# Patient Record
Sex: MALE | Race: WHITE | Employment: OTHER | URBAN - METROPOLITAN AREA
[De-identification: names, ages, dates, MRNs, and addresses within clinical notes are randomized per-mention and may not be internally consistent; named-entity substitution may affect disease eponyms.]

---

## 2017-10-17 ENCOUNTER — HOSPITAL ENCOUNTER (OUTPATIENT)
Age: 62
Discharge: HOME | End: 2017-10-17
Attending: EMERGENCY MEDICINE
Payer: COMMERCIAL

## 2017-10-17 VITALS
TEMPERATURE: 98 F | OXYGEN SATURATION: 100 % | BODY MASS INDEX: 27.77 KG/M2 | RESPIRATION RATE: 18 BRPM | HEIGHT: 72 IN | HEART RATE: 61 BPM | WEIGHT: 205 LBS | SYSTOLIC BLOOD PRESSURE: 143 MMHG | DIASTOLIC BLOOD PRESSURE: 81 MMHG

## 2017-10-17 DIAGNOSIS — J01.90 ACUTE SINUSITIS, RECURRENCE NOT SPECIFIED, UNSPECIFIED LOCATION: Primary | ICD-10-CM

## 2017-10-17 PROCEDURE — 99213 OFFICE O/P EST LOW 20 MIN: CPT

## 2017-10-17 PROCEDURE — 99203 OFFICE O/P NEW LOW 30 MIN: CPT

## 2017-10-17 RX ORDER — ASPIRIN 81 MG/1
TABLET, CHEWABLE ORAL DAILY
COMMUNITY

## 2017-10-17 RX ORDER — FEXOFENADINE HCL AND PSEUDOEPHEDRINE HCI 60; 120 MG/1; MG/1
1 TABLET, EXTENDED RELEASE ORAL 2 TIMES DAILY
Qty: 20 TABLET | Refills: 0 | Status: SHIPPED | OUTPATIENT
Start: 2017-10-17 | End: 2017-10-27

## 2017-10-17 RX ORDER — AMOXICILLIN AND CLAVULANATE POTASSIUM 875; 125 MG/1; MG/1
1 TABLET, FILM COATED ORAL 2 TIMES DAILY
Qty: 20 TABLET | Refills: 0 | Status: SHIPPED | OUTPATIENT
Start: 2017-10-17 | End: 2017-10-27

## 2017-10-17 RX ORDER — FLUTICASONE PROPIONATE 50 MCG
2 SPRAY, SUSPENSION (ML) NASAL DAILY
Qty: 16 G | Refills: 0 | Status: SHIPPED | OUTPATIENT
Start: 2017-10-17 | End: 2017-11-16

## 2017-10-17 NOTE — ED INITIAL ASSESSMENT (HPI)
C/o sinus-like pressure on right side face around jaw line and under right eye for 1 week; has had runny nose as well; denies cough, sore throat fever or NVD

## 2017-10-17 NOTE — ED PROVIDER NOTES
Patient Seen in: [address]    History   Patient presents with:  Cough/URI    Stated Complaint: sinus pain    HPI    79-year-old male patient presents her complaining of a right-sided facial pain associated congestion an to display    ============================================================  ED Course  ------------------------------------------------------------  MDM           Disposition and Plan     Clinical Impression:  Acute sinusitis, recurrence not specified, uns

## 2018-08-06 ENCOUNTER — HOSPITAL ENCOUNTER (OUTPATIENT)
Age: 63
Discharge: HOME | End: 2018-08-06
Attending: EMERGENCY MEDICINE
Payer: COMMERCIAL

## 2018-08-06 VITALS
SYSTOLIC BLOOD PRESSURE: 146 MMHG | OXYGEN SATURATION: 100 % | HEART RATE: 52 BPM | RESPIRATION RATE: 16 BRPM | TEMPERATURE: 97 F | DIASTOLIC BLOOD PRESSURE: 79 MMHG

## 2018-08-06 DIAGNOSIS — H60.502 ACUTE OTITIS EXTERNA OF LEFT EAR, UNSPECIFIED TYPE: Primary | ICD-10-CM

## 2018-08-06 PROCEDURE — 99213 OFFICE O/P EST LOW 20 MIN: CPT

## 2018-08-06 PROCEDURE — 99214 OFFICE O/P EST MOD 30 MIN: CPT

## 2018-08-06 RX ORDER — OFLOXACIN 3 MG/ML
SOLUTION AURICULAR (OTIC) DAILY
Qty: 1 BOTTLE | Refills: 0 | Status: SHIPPED | OUTPATIENT
Start: 2018-08-06 | End: 2018-08-13

## 2018-08-06 RX ORDER — AMOXICILLIN AND CLAVULANATE POTASSIUM 875; 125 MG/1; MG/1
1 TABLET, FILM COATED ORAL 2 TIMES DAILY
Qty: 20 TABLET | Refills: 0 | Status: SHIPPED | OUTPATIENT
Start: 2018-08-06 | End: 2018-08-16

## 2018-08-06 NOTE — ED PROVIDER NOTES
Patient Seen in: [address]    History   Patient presents with:  Ear Problem Pain (neurosensory)    Stated Complaint: left ear pain    HPI    The patient has been complaining about discomfort in his left ear for approxim left.  Mild discomfort with tugging on pinna. There is erythema and mild swelling of the ear canal on the left with suggestion of slight discharge. Limited view of TM, about redness, or bulging  Neck: Normal range of motion. Neck supple.  No rigidity or a

## 2020-09-21 ENCOUNTER — OFFICE VISIT (OUTPATIENT)
Dept: SURGERY | Facility: CLINIC | Age: 65
End: 2020-09-21
Payer: COMMERCIAL

## 2020-09-21 VITALS
SYSTOLIC BLOOD PRESSURE: 146 MMHG | BODY MASS INDEX: 28.17 KG/M2 | RESPIRATION RATE: 16 BRPM | HEIGHT: 72 IN | DIASTOLIC BLOOD PRESSURE: 87 MMHG | HEART RATE: 48 BPM | WEIGHT: 208 LBS

## 2020-09-21 DIAGNOSIS — Z79.82 LONG TERM CURRENT USE OF ASPIRIN: ICD-10-CM

## 2020-09-21 DIAGNOSIS — R35.1 NOCTURIA: ICD-10-CM

## 2020-09-21 DIAGNOSIS — N40.1 BENIGN PROSTATIC HYPERPLASIA WITH URINARY FREQUENCY: ICD-10-CM

## 2020-09-21 DIAGNOSIS — N42.89 ASYMMETRIC PROSTATE: ICD-10-CM

## 2020-09-21 DIAGNOSIS — R97.20 ELEVATED PSA: Primary | ICD-10-CM

## 2020-09-21 DIAGNOSIS — R35.0 BENIGN PROSTATIC HYPERPLASIA WITH URINARY FREQUENCY: ICD-10-CM

## 2020-09-21 DIAGNOSIS — N42.89 PROSTATE INDURATION: ICD-10-CM

## 2020-09-21 PROCEDURE — 3077F SYST BP >= 140 MM HG: CPT | Performed by: UROLOGY

## 2020-09-21 PROCEDURE — 3079F DIAST BP 80-89 MM HG: CPT | Performed by: UROLOGY

## 2020-09-21 PROCEDURE — 99244 OFF/OP CNSLTJ NEW/EST MOD 40: CPT | Performed by: UROLOGY

## 2020-09-21 PROCEDURE — 3008F BODY MASS INDEX DOCD: CPT | Performed by: UROLOGY

## 2020-09-21 NOTE — PATIENT INSTRUCTIONS
Ezequiel Ingram M.D.    1.  At this point in time, you  have decided against a prostate biopsy now, and prefer to be re-evaluated in 4 months. 2.   Return visit with me in 4 months.     3.  Please get blood draw

## 2020-09-21 NOTE — PROGRESS NOTES
Александр Stock is a 62year old male. HPI:   Patient presents with:  elevated psa: rise in the PSA, denies family hx prostate cancer      History provided by patient. Referred by his PCP, Dr. Iesha Lara. Elevated PSA   Problem started 2018.  Most recen daily. Patient denies fume exposure. Family History-- He denies family history of cancers and kidney stones. HISTORY:  No past medical history on file.    Past Surgical History:   Procedure Laterality Date   • COLONOSCOPY  01/2019    osis      N cough, dyspnea and wheezing      PHYSICAL EXAM:   Constitutional: appears well hydrated alert and responsive no acute distress noted  Neurological: Oriented to time, place, person with normal affect  Exam appropriate for age; patellar reflexes normal bilat prostate  Benign prostatic hyperplasia with urinary frequency  Nocturia  Long term current use of aspirin    (R97.20) Elevated PSA  (primary encounter diagnosis)  Chronic. Problem started 2018.  Most recent 06/16/2020 Cecille Lopez) PSA = 5.257, 11% christiano of 3.5, mild voiding dysfunction category; nocturia 1-2x.  He is not taking any medication for this.     (Z79.82) Long term current use of aspirin  Patient is currently taking aspirin 81 mg daily and is at risk for future surgical procedures and will need t

## 2021-01-04 ENCOUNTER — TELEPHONE (OUTPATIENT)
Dept: SURGERY | Facility: CLINIC | Age: 66
End: 2021-01-04

## 2021-01-05 NOTE — TELEPHONE ENCOUNTER
Letter created. In outgoing mail.      Future Appointments   Date Time Provider Emeli Garcia   2/8/2021  1:00 PM Bernard Cooper MD [facility]

## 2021-01-05 NOTE — TELEPHONE ENCOUNTER
` Urology medical assistant---    Please send patient a letter----    12/11/2020 total PSA elevated 4.638; percent free PSA is 9%. Total PSA is mildly improved compared to previous whereas percent free PSA is slightly worse than previous.     I will disc

## 2021-05-26 ENCOUNTER — OFFICE VISIT (OUTPATIENT)
Dept: SURGERY | Facility: CLINIC | Age: 66
End: 2021-05-26
Payer: COMMERCIAL

## 2021-05-26 VITALS — DIASTOLIC BLOOD PRESSURE: 76 MMHG | SYSTOLIC BLOOD PRESSURE: 118 MMHG

## 2021-05-26 DIAGNOSIS — R35.0 BENIGN PROSTATIC HYPERPLASIA WITH URINARY FREQUENCY: ICD-10-CM

## 2021-05-26 DIAGNOSIS — R35.1 NOCTURIA: ICD-10-CM

## 2021-05-26 DIAGNOSIS — N42.89 ASYMMETRIC PROSTATE: ICD-10-CM

## 2021-05-26 DIAGNOSIS — Z79.82 LONG TERM CURRENT USE OF ASPIRIN: ICD-10-CM

## 2021-05-26 DIAGNOSIS — N42.89 PROSTATE INDURATION: ICD-10-CM

## 2021-05-26 DIAGNOSIS — N40.1 BENIGN PROSTATIC HYPERPLASIA WITH URINARY FREQUENCY: ICD-10-CM

## 2021-05-26 DIAGNOSIS — R97.20 ELEVATED PSA: Primary | ICD-10-CM

## 2021-05-26 PROCEDURE — 3074F SYST BP LT 130 MM HG: CPT | Performed by: UROLOGY

## 2021-05-26 PROCEDURE — 3078F DIAST BP <80 MM HG: CPT | Performed by: UROLOGY

## 2021-05-26 PROCEDURE — 99215 OFFICE O/P EST HI 40 MIN: CPT | Performed by: UROLOGY

## 2021-05-26 RX ORDER — CIPROFLOXACIN 500 MG/1
TABLET, FILM COATED ORAL
Qty: 6 TABLET | Refills: 0 | Status: SHIPPED | OUTPATIENT
Start: 2021-05-26

## 2021-05-26 RX ORDER — CEFDINIR 300 MG/1
300 CAPSULE ORAL EVERY 12 HOURS
Qty: 6 CAPSULE | Refills: 0 | Status: SHIPPED | OUTPATIENT
Start: 2021-05-26 | End: 2021-05-29

## 2021-05-26 NOTE — PATIENT INSTRUCTIONS
Guillaume Hudson M.D.    1.  Proceed with plans for ultrasound-guided needle biopsy of prostate in the office    2.    Stop aspirin 7 days before biopsy and NSAIDs such as Motrin, Advil, Aleve, naproxen, ibuprofen 5  d

## 2021-05-26 NOTE — PROGRESS NOTES
HPI:    Patient ID: Triston Marshall is a 60year old male. HPI     Elevated PSA   Chronic. Problem started 2018. Most recent 12/11/2020 [facility]) PSA = 4.638, 9% free PSA (57.5% probability of prostate cancer).   Patient presently denies any asso induration in distal half of right lobe; nodule is suspicious for prostate cancer, especially in combination with his multiple elevated PSA levels; currently taking aspirin 81 mg daily;  I recommend prostate biopsy; patient declines and wants to be re-eval distress. Genitourinary:     Comments: On MANJINDER, prostate 2+ enlarged, 1 cm induration right apex. Musculoskeletal:         General: Normal range of motion. Cervical back: Normal range of motion. Skin:     General: Skin is dry.    Neurological: compared to 06/16/2020 Elvie [facility]zo [facility]) PSA = 5.257, 11% free PSA (33.9% probability of prostate cancer). I explain to patient that he has a 58% probability of prostate cancer and a 42% probability that he does not.  Patient presently denies any symptoms mild voiding dysfunction category; nocturia 1x. He is not currently taking any medication for this. The patient feels this is stable and continues observation.     (Z79.82) Long term current use of aspirin  Chronic.  Patient is currently taking aspirin 81 m particular blood test.  Please asked them to fax us a copy of the results and asked them to give you a copy as well. IF the PSA were to come back in the normal range, we would cancel your scheduled biopsy.       Orders Placed This Encounter      PSA, Tot

## 2021-05-27 ENCOUNTER — TELEPHONE (OUTPATIENT)
Dept: SURGERY | Facility: CLINIC | Age: 66
End: 2021-05-27

## 2021-05-27 RX ORDER — DIAZEPAM 5 MG/1
TABLET ORAL
Qty: 3 TABLET | Refills: 0 | OUTPATIENT
Start: 2021-05-27 | End: 2022-02-15 | Stop reason: ALTCHOICE

## 2021-05-27 NOTE — TELEPHONE ENCOUNTER
Late entry. After scheduling patients prostate biopsy. PT requested a valium to take prior to the procedure. PT aware he needs . Please advise.

## 2021-05-28 NOTE — TELEPHONE ENCOUNTER
Please call in prescription for---    Diazepam 5 mg tablet,  Three tablets p.o. upon arrival at our office for procedure; must have a . To eat breakfast and lunch that day.   Thank you, Dr. Geoff Hernandez

## 2021-06-22 ENCOUNTER — PATIENT MESSAGE (OUTPATIENT)
Dept: SURGERY | Facility: CLINIC | Age: 66
End: 2021-06-22

## 2021-06-23 NOTE — TELEPHONE ENCOUNTER
Verified on availity if patient needs prior auth for pros Bx with [facility] on 6/28    [id number] - Biopsy Of Prostate  In Network   No Auth Required  Office

## 2021-06-25 ENCOUNTER — PATIENT MESSAGE (OUTPATIENT)
Dept: SURGERY | Facility: CLINIC | Age: 66
End: 2021-06-25

## 2021-06-25 ENCOUNTER — TELEPHONE (OUTPATIENT)
Dept: SURGERY | Facility: CLINIC | Age: 66
End: 2021-06-25

## 2021-06-25 NOTE — TELEPHONE ENCOUNTER
Patient asking for a callback to confirm office received the most recent lab results because Dr. Danuta Barbosa office sent him a copy of lab as well but it was not the most recent.

## 2021-06-25 NOTE — TELEPHONE ENCOUNTER
ISIAH pt verified name and . Pt states he had PSA done at Dr. Jose Hernández office. We have not received the results. Called Dr. Jose Hernández office. Fax results received from them.   Results placed in patient's chart for Monday's appointment

## 2021-06-25 NOTE — TELEPHONE ENCOUNTER
DR Yeimy Yates. Pts free PSA is 10.6 up from 9. PA 6.4 UP FROM 5, Results were faxed from PCP office today. Not scanned in [facility] yet but in his chart for Monday's prostate bx. He was wondering if you still think he needs the bx.  He is available at [phone]

## 2021-06-25 NOTE — TELEPHONE ENCOUNTER
Received PSA results from [facility] pt has leona 6/28/21 will hold for upcoming leona in file folder  [facility] is out of the office until 6/28/21.

## 2021-06-28 ENCOUNTER — PROCEDURE (OUTPATIENT)
Dept: SURGERY | Facility: CLINIC | Age: 66
End: 2021-06-28
Payer: COMMERCIAL

## 2021-06-28 VITALS
HEIGHT: 72 IN | BODY MASS INDEX: 28.17 KG/M2 | SYSTOLIC BLOOD PRESSURE: 130 MMHG | WEIGHT: 208 LBS | DIASTOLIC BLOOD PRESSURE: 82 MMHG | HEART RATE: 52 BPM

## 2021-06-28 DIAGNOSIS — R97.20 ELEVATED PSA: Primary | ICD-10-CM

## 2021-06-28 PROCEDURE — 3079F DIAST BP 80-89 MM HG: CPT | Performed by: UROLOGY

## 2021-06-28 PROCEDURE — 55700 BIOPSY OF PROSTATE,NEEDLE/PUNCH: CPT | Performed by: UROLOGY

## 2021-06-28 PROCEDURE — 3075F SYST BP GE 130 - 139MM HG: CPT | Performed by: UROLOGY

## 2021-06-28 PROCEDURE — 76872 US TRANSRECTAL: CPT | Performed by: UROLOGY

## 2021-06-28 PROCEDURE — 3008F BODY MASS INDEX DOCD: CPT | Performed by: UROLOGY

## 2021-06-28 NOTE — TELEPHONE ENCOUNTER
Called patient no answer left message that patient should come to prostate bx as mentioned in [employer] message sent by Dr Michael Yao and if he has any questions to call office.

## 2021-06-28 NOTE — PATIENT INSTRUCTIONS
Jailene Ramirez M.D.    1. Finish your prescribed 3 day course of the 2 different antibiotics as directed. 2. No heavy lifting or strenuous physical activity for a few days.     3. No aspirin or NSAIDs for 24 hours

## 2021-06-28 NOTE — TELEPHONE ENCOUNTER
Urology nurses,    Please call patient at [phone] to make sure that he comes to the office for his prostate biopsy on 6/28/2021 Monday at 11 AM and also make sure that he reviewed the following message that I sent him now Noel evening 6/27/2021 =  \

## 2021-06-28 NOTE — PROGRESS NOTES
[facility] Patient Status:  Specimen    1955 MRN [id number]   Location [address] [place] [address] Attending Cuco Farooq MD   Hosp Day # 0 PCP MD Jayla Red is a 60 imaging the prostate in the transverse and sagittal planes and determining maximum height, width and length dimensions. The decision was then made to proceed with prostate biopsy under ultrasound guidance.  Using a 7\"22 gauge spinal needle and using ultra

## 2021-07-02 ENCOUNTER — PATIENT MESSAGE (OUTPATIENT)
Dept: SURGERY | Facility: CLINIC | Age: 66
End: 2021-07-02

## 2021-07-02 ENCOUNTER — TELEPHONE (OUTPATIENT)
Dept: SURGERY | Facility: CLINIC | Age: 66
End: 2021-07-02

## 2021-07-02 NOTE — TELEPHONE ENCOUNTER
I sent patient the following message by means of \"my chart\" right now ---    \"Boyd,  I just called you now but only received voicemail so I am responding by means of \"my chart\".   First, your prostate biopsy showed a small amount of well differentia

## 2021-07-06 NOTE — TELEPHONE ENCOUNTER
See 6/28/21 Path report. Result note copied below. Patient contacted. Patient states he read the mychart result note from Dr. Parker Washington. Patient states all questions answered.  Patient made a 40 min f/u visit for Mon 7/26 @ 11:00 am.       I sent daniel invariably the secretions will be bloody.           My staff will be contacting you. Litzy Portillo a great July 4 weekend and I wish you the best of health, Dr. Rachana Mane.  \"

## 2021-07-26 ENCOUNTER — OFFICE VISIT (OUTPATIENT)
Dept: SURGERY | Facility: CLINIC | Age: 66
End: 2021-07-26
Payer: COMMERCIAL

## 2021-07-26 VITALS
DIASTOLIC BLOOD PRESSURE: 82 MMHG | WEIGHT: 209 LBS | BODY MASS INDEX: 28.31 KG/M2 | SYSTOLIC BLOOD PRESSURE: 144 MMHG | HEART RATE: 70 BPM | HEIGHT: 72 IN

## 2021-07-26 DIAGNOSIS — N40.1 BENIGN PROSTATIC HYPERPLASIA WITH URINARY FREQUENCY: ICD-10-CM

## 2021-07-26 DIAGNOSIS — C61 PROSTATE CANCER (HCC): Primary | ICD-10-CM

## 2021-07-26 DIAGNOSIS — Z79.82 LONG TERM CURRENT USE OF ASPIRIN: ICD-10-CM

## 2021-07-26 DIAGNOSIS — R35.0 BENIGN PROSTATIC HYPERPLASIA WITH URINARY FREQUENCY: ICD-10-CM

## 2021-07-26 DIAGNOSIS — R35.1 NOCTURIA: ICD-10-CM

## 2021-07-26 PROCEDURE — 3077F SYST BP >= 140 MM HG: CPT | Performed by: UROLOGY

## 2021-07-26 PROCEDURE — 3079F DIAST BP 80-89 MM HG: CPT | Performed by: UROLOGY

## 2021-07-26 PROCEDURE — 3008F BODY MASS INDEX DOCD: CPT | Performed by: UROLOGY

## 2021-07-26 PROCEDURE — 99215 OFFICE O/P EST HI 40 MIN: CPT | Performed by: UROLOGY

## 2021-07-26 NOTE — PATIENT INSTRUCTIONS
Alberta Carrizales M.D.      1.   You have chosen to follow guideline recommendations of the American urologic Association concerning treatment of your prostate cancer and have decided to start with \"active surveill

## 2021-07-26 NOTE — PROGRESS NOTES
HPI:    Patient ID: Priti Cruz is a 60year old male. HPI    History Provided by patient and his wife, Soraya Cardona.        Prostate Cancer  Chronic. Problem started in 06/28/2021. [place]  score 3+3=6 (Grade Group 1) of the prostate.  The patient is not o prostate cancer, especially in combination with his multiple elevated PSA levels; currently taking aspirin 81 mg daily;  I recommend prostate biopsy; patient declines and wants to be re-evaluated   05/26/2021 Office visit with me;  On MANJINDER, prostate 2+ enlar acute distress. Appearance: He is well-developed. HENT:      Head: Normocephalic and atraumatic. Pulmonary:      Effort: Pulmonary effort is normal. No respiratory distress.    Genitourinary:     Comments: I did not examine prostate today; however, current use of aspirin       (C61) Prostate cancer ([facility] [address]  (primary encounter diagnosis)  Diagnosed on 06/28/2021  prostate biopsy grade group 1; adenocarcinoma prostate [facility] score 3+3=6, risk very low .  On 12/11/2020 [facility])  PSA =  4.638, 9% confined; probability of extraprostatic extension; of there being seminal vesicle or lymph node involvement and the clinical significance and treatment implications of that. The patient understands these issues.   I answer patient's questions on treatment, patient feels this is stable and continues observation. Patient will follow up in 6 months with a UA prior to visit.      (Z79.82) Long term current use of aspirin  Chronic.  Patient is currently taking aspirin 81 mg daily and is at risk for future surgical PM

## 2022-02-15 ENCOUNTER — OFFICE VISIT (OUTPATIENT)
Dept: SURGERY | Facility: CLINIC | Age: 67
End: 2022-02-15
Payer: COMMERCIAL

## 2022-02-15 VITALS
BODY MASS INDEX: 28.31 KG/M2 | WEIGHT: 209 LBS | DIASTOLIC BLOOD PRESSURE: 81 MMHG | HEART RATE: 65 BPM | HEIGHT: 72 IN | SYSTOLIC BLOOD PRESSURE: 134 MMHG

## 2022-02-15 DIAGNOSIS — N40.1 BENIGN PROSTATIC HYPERPLASIA WITH URINARY FREQUENCY: ICD-10-CM

## 2022-02-15 DIAGNOSIS — C61 PROSTATE CANCER (HCC): Primary | ICD-10-CM

## 2022-02-15 DIAGNOSIS — Z79.82 LONG TERM CURRENT USE OF ASPIRIN: ICD-10-CM

## 2022-02-15 DIAGNOSIS — R35.1 NOCTURIA: ICD-10-CM

## 2022-02-15 DIAGNOSIS — R35.0 BENIGN PROSTATIC HYPERPLASIA WITH URINARY FREQUENCY: ICD-10-CM

## 2022-02-15 PROCEDURE — 3075F SYST BP GE 130 - 139MM HG: CPT | Performed by: UROLOGY

## 2022-02-15 PROCEDURE — 3079F DIAST BP 80-89 MM HG: CPT | Performed by: UROLOGY

## 2022-02-15 PROCEDURE — 99214 OFFICE O/P EST MOD 30 MIN: CPT | Performed by: UROLOGY

## 2022-02-15 PROCEDURE — 3008F BODY MASS INDEX DOCD: CPT | Performed by: UROLOGY

## 2022-02-15 NOTE — PATIENT INSTRUCTIONS
Ángel Moreira M.D.    1.    During today's visit you decided to continue active surveillance for now    2. Visit  July 2022. Please get blood draw for PSA and submit urine specimen for complete urinalysis Dr. Ethan Teresa office 3--10 days before visit. ALSO please schedule MRI multiplanar 3T of prostate either end of July or beginning August 2022. Please call [phone] to schedule.

## 2022-02-18 ENCOUNTER — TELEPHONE (OUTPATIENT)
Dept: SURGERY | Facility: CLINIC | Age: 67
End: 2022-02-18

## 2022-02-18 NOTE — TELEPHONE ENCOUNTER
Received PSA results on PSA from 2/8/22 Dr. Julius Meehan is already aware of these results when pt was in to see him on 2/15/22 so sending this psa to be scanned. Patient has chosen active survillence so far. Most recent 02/08/2022 PSA = 6.2 stable compared to 06/2021 PSA = 6.4.  On MANJINDER today, prostate 2-3+ enlarged,

## 2024-12-05 ENCOUNTER — LAB ENCOUNTER (OUTPATIENT)
Dept: LAB | Age: 69
End: 2024-12-05
Attending: INTERNAL MEDICINE
Payer: MEDICARE

## 2024-12-05 DIAGNOSIS — E78.5 HYPERLIPEMIA: Primary | ICD-10-CM

## 2024-12-05 DIAGNOSIS — R97.20 ELEVATED PROSTATE SPECIFIC ANTIGEN (PSA): ICD-10-CM

## 2024-12-05 LAB
ALBUMIN SERPL-MCNC: 4.5 G/DL (ref 3.2–4.8)
ALBUMIN/GLOB SERPL: 1.8 {RATIO} (ref 1–2)
ALP LIVER SERPL-CCNC: 86 U/L
ALT SERPL-CCNC: 39 U/L
ANION GAP SERPL CALC-SCNC: 10 MMOL/L (ref 0–18)
AST SERPL-CCNC: 51 U/L (ref ?–34)
BASOPHILS # BLD AUTO: 0.03 X10(3) UL (ref 0–0.2)
BASOPHILS NFR BLD AUTO: 0.6 %
BILIRUB SERPL-MCNC: 1.1 MG/DL (ref 0.2–1.1)
BUN BLD-MCNC: 16 MG/DL (ref 9–23)
CALCIUM BLD-MCNC: 10.4 MG/DL (ref 8.7–10.4)
CHLORIDE SERPL-SCNC: 105 MMOL/L (ref 98–112)
CHOLEST SERPL-MCNC: 157 MG/DL (ref ?–200)
CO2 SERPL-SCNC: 25 MMOL/L (ref 21–32)
CREAT BLD-MCNC: 1.04 MG/DL
EGFRCR SERPLBLD CKD-EPI 2021: 78 ML/MIN/1.73M2 (ref 60–?)
EOSINOPHIL # BLD AUTO: 0.31 X10(3) UL (ref 0–0.7)
EOSINOPHIL NFR BLD AUTO: 6 %
ERYTHROCYTE [DISTWIDTH] IN BLOOD BY AUTOMATED COUNT: 13.2 %
FASTING PATIENT LIPID ANSWER: YES
FASTING STATUS PATIENT QL REPORTED: YES
GLOBULIN PLAS-MCNC: 2.5 G/DL (ref 2–3.5)
GLUCOSE BLD-MCNC: 92 MG/DL (ref 70–99)
HCT VFR BLD AUTO: 47 %
HDLC SERPL-MCNC: 59 MG/DL (ref 40–59)
HGB BLD-MCNC: 15.7 G/DL
IMM GRANULOCYTES # BLD AUTO: 0.01 X10(3) UL (ref 0–1)
IMM GRANULOCYTES NFR BLD: 0.2 %
LDLC SERPL CALC-MCNC: 85 MG/DL (ref ?–100)
LYMPHOCYTES # BLD AUTO: 2.06 X10(3) UL (ref 1–4)
LYMPHOCYTES NFR BLD AUTO: 40 %
MCH RBC QN AUTO: 30.3 PG (ref 26–34)
MCHC RBC AUTO-ENTMCNC: 33.4 G/DL (ref 31–37)
MCV RBC AUTO: 90.7 FL
MONOCYTES # BLD AUTO: 0.64 X10(3) UL (ref 0.1–1)
MONOCYTES NFR BLD AUTO: 12.4 %
NEUTROPHILS # BLD AUTO: 2.1 X10 (3) UL (ref 1.5–7.7)
NEUTROPHILS # BLD AUTO: 2.1 X10(3) UL (ref 1.5–7.7)
NEUTROPHILS NFR BLD AUTO: 40.8 %
NONHDLC SERPL-MCNC: 98 MG/DL (ref ?–130)
OSMOLALITY SERPL CALC.SUM OF ELEC: 291 MOSM/KG (ref 275–295)
PLATELET # BLD AUTO: 161 10(3)UL (ref 150–450)
POTASSIUM SERPL-SCNC: 4.2 MMOL/L (ref 3.5–5.1)
PROT SERPL-MCNC: 7 G/DL (ref 5.7–8.2)
PSA SERPL-MCNC: 7.01 NG/ML (ref ?–4)
RBC # BLD AUTO: 5.18 X10(6)UL
SODIUM SERPL-SCNC: 140 MMOL/L (ref 136–145)
TRIGL SERPL-MCNC: 67 MG/DL (ref 30–149)
VLDLC SERPL CALC-MCNC: 11 MG/DL (ref 0–30)
WBC # BLD AUTO: 5.2 X10(3) UL (ref 4–11)

## 2024-12-05 PROCEDURE — 85025 COMPLETE CBC W/AUTO DIFF WBC: CPT

## 2024-12-05 PROCEDURE — 84153 ASSAY OF PSA TOTAL: CPT

## 2024-12-05 PROCEDURE — 36415 COLL VENOUS BLD VENIPUNCTURE: CPT

## 2024-12-05 PROCEDURE — 80061 LIPID PANEL: CPT

## 2024-12-05 PROCEDURE — 80053 COMPREHEN METABOLIC PANEL: CPT

## (undated) NOTE — LETTER
No referring provider defined for this encounter. 09/21/20        Patient: Joan Archibald   YOB: 1955   Date of Visit: 9/21/2020       Dear  Dr. Ashley Dent MD,      Thank you for referring Joan Archibald to my practice.   Please Chronic. Problem started 2018. Most recent 06/16/2020 [facility]) PSA = 5.257, 11% free PSA (33.9% probability of prostate cancer). I explained to patient that he has a 34% probability of prostate cancer and a 66% probability that he does not.  Patric Patient is currently taking aspirin 81 mg daily and is at risk for future surgical procedures and will need to hold medication prior to any surgical procedure. RECOMMENDATIONS AND TREATMENT PLAN      1.   At this point in time, you  have decided aga

## (undated) NOTE — LETTER
1/5/2021              [facility]        [address] [place] [address]         Dear Deloris Pastrana,           12/11/2020 total PSA elevated 4.638; percent free PSA is 9%.     Total PSA is mildly improved compared to previous whereas

## (undated) NOTE — LETTER
No referring provider defined for this encounter.        07/26/21        Patient: Michael Johnston   YOB: 1955   Date of Visit: 7/26/2021       Dear  Dr. Danny Swanson MD,      I had a long discussion with Michael Johnston and wife about his his urinating problem. The patient drinks 8oz of water before bedtime, 32 - 40 oz of caffeine in the morning, and  4 oz of alcoholic. Aspirin therapy     Chronic. Patient is currently taking aspirin 81 mg daily; denies side effects.  He feels this 3 tablet     0       •     Ciprofloxacin HCl 500 MG Oral Tab     1 tablet twice daily for 3 days: Start in the morning,  1 day before biopsy     6 tablet     0       •     PEG 3350-KCl-Na Bicarb-NaCl 420 g Oral Recon Soln     Take as directed per MD (Pa specimen, Perineural invasion is NOT identified     Left [place] -- Herman score 3+3=6 (Grade Group 1), one out of three tissue cores , 5% of entire specimen, Perineural invasion is NOT identified.                LABORATORIES     12/11/2020 Anai Lopez) complications, possibility of a heart attack or stroke), complications, side effects, reasons for, nature of, alternatives to the open or robotic radical prostatectomy, ultrasound guided placement of radioactive implants into the prostate, standard externa alternatives to these treatment options and I answered patient's questions; patient against decides definitive radiation treatment and against robotic radical surgery and chooses start active surveillance.   I explain to patient that if he chooses active quintero Visit with me in 6 months. Please get blood draw for PSA at Dr. Ta Ledesma office about               10 days before the visit; please bring paper copy of the PSA results with you to your visit with me.   No sexual stimulation whatsoever for 5 days before the

## (undated) NOTE — LETTER
No referring provider defined for this encounter. 05/26/21        Patient: Terri Judd   YOB: 1955   Date of Visit: 5/26/2021       Dear  Dr. Reji Black MD,      I evaluated your patient Terri Judd in the office today. including urosepsis. I answered patient's questions on treatment; patient understands all of this and decides to proceed with prostate biopsy  with PSA before procedure.  I fully discussed with the patient preoperative preparations and post operative care-- biopsy. Please follow instructions on the box. You can buy the enema  at your pharmacy without a prescription    4.   Milk of magnesia or generic equivalent 2 ounces = 60 mL = 4 tablespoons at 6 PM, evening before the biopsy    5   Please start cefdinir 3